# Patient Record
Sex: MALE | Race: OTHER | NOT HISPANIC OR LATINO | URBAN - METROPOLITAN AREA
[De-identification: names, ages, dates, MRNs, and addresses within clinical notes are randomized per-mention and may not be internally consistent; named-entity substitution may affect disease eponyms.]

---

## 2022-03-31 ENCOUNTER — OUTPATIENT (OUTPATIENT)
Dept: OUTPATIENT SERVICES | Facility: HOSPITAL | Age: 37
LOS: 1 days | Discharge: HOME | End: 2022-03-31

## 2022-04-01 DIAGNOSIS — F10.10 ALCOHOL ABUSE, UNCOMPLICATED: ICD-10-CM

## 2023-02-17 ENCOUNTER — EMERGENCY (EMERGENCY)
Facility: HOSPITAL | Age: 38
LOS: 1 days | Discharge: HOME | End: 2023-02-17
Attending: EMERGENCY MEDICINE | Admitting: STUDENT IN AN ORGANIZED HEALTH CARE EDUCATION/TRAINING PROGRAM
Payer: MEDICAID

## 2023-02-17 VITALS
DIASTOLIC BLOOD PRESSURE: 79 MMHG | TEMPERATURE: 98 F | SYSTOLIC BLOOD PRESSURE: 120 MMHG | OXYGEN SATURATION: 99 % | HEART RATE: 76 BPM | RESPIRATION RATE: 16 BRPM

## 2023-02-17 PROCEDURE — 73070 X-RAY EXAM OF ELBOW: CPT | Mod: 26,LT

## 2023-02-17 PROCEDURE — 73110 X-RAY EXAM OF WRIST: CPT | Mod: 26,LT

## 2023-02-17 PROCEDURE — 71045 X-RAY EXAM CHEST 1 VIEW: CPT | Mod: 26

## 2023-02-17 PROCEDURE — 73000 X-RAY EXAM OF COLLAR BONE: CPT | Mod: 26,LT

## 2023-02-17 PROCEDURE — 99285 EMERGENCY DEPT VISIT HI MDM: CPT

## 2023-02-17 PROCEDURE — 73060 X-RAY EXAM OF HUMERUS: CPT | Mod: 26,LT

## 2023-02-17 PROCEDURE — 73090 X-RAY EXAM OF FOREARM: CPT | Mod: 26,LT,76

## 2023-02-17 PROCEDURE — 71250 CT THORAX DX C-: CPT | Mod: 26,MA

## 2023-02-17 RX ORDER — ACETAMINOPHEN 500 MG
650 TABLET ORAL ONCE
Refills: 0 | Status: COMPLETED | OUTPATIENT
Start: 2023-02-17 | End: 2023-02-17

## 2023-02-17 RX ORDER — OXYCODONE HYDROCHLORIDE 5 MG/1
5 TABLET ORAL ONCE
Refills: 0 | Status: DISCONTINUED | OUTPATIENT
Start: 2023-02-17 | End: 2023-02-17

## 2023-02-17 RX ORDER — CEFAZOLIN SODIUM 1 G
1000 VIAL (EA) INJECTION ONCE
Refills: 0 | Status: COMPLETED | OUTPATIENT
Start: 2023-02-17 | End: 2023-02-17

## 2023-02-17 RX ORDER — TETANUS TOXOID, REDUCED DIPHTHERIA TOXOID AND ACELLULAR PERTUSSIS VACCINE, ADSORBED 5; 2.5; 8; 8; 2.5 [IU]/.5ML; [IU]/.5ML; UG/.5ML; UG/.5ML; UG/.5ML
0.5 SUSPENSION INTRAMUSCULAR ONCE
Refills: 0 | Status: COMPLETED | OUTPATIENT
Start: 2023-02-17 | End: 2023-02-17

## 2023-02-17 RX ORDER — MORPHINE SULFATE 50 MG/1
4 CAPSULE, EXTENDED RELEASE ORAL ONCE
Refills: 0 | Status: DISCONTINUED | OUTPATIENT
Start: 2023-02-17 | End: 2023-02-17

## 2023-02-17 RX ADMIN — Medication 650 MILLIGRAM(S): at 19:17

## 2023-02-17 RX ADMIN — OXYCODONE HYDROCHLORIDE 5 MILLIGRAM(S): 5 TABLET ORAL at 19:17

## 2023-02-17 RX ADMIN — Medication 100 MILLIGRAM(S): at 23:52

## 2023-02-17 RX ADMIN — MORPHINE SULFATE 4 MILLIGRAM(S): 50 CAPSULE, EXTENDED RELEASE ORAL at 20:34

## 2023-02-17 RX ADMIN — Medication 650 MILLIGRAM(S): at 19:47

## 2023-02-17 RX ADMIN — OXYCODONE HYDROCHLORIDE 5 MILLIGRAM(S): 5 TABLET ORAL at 19:47

## 2023-02-17 RX ADMIN — TETANUS TOXOID, REDUCED DIPHTHERIA TOXOID AND ACELLULAR PERTUSSIS VACCINE, ADSORBED 0.5 MILLILITER(S): 5; 2.5; 8; 8; 2.5 SUSPENSION INTRAMUSCULAR at 23:54

## 2023-02-17 RX ADMIN — MORPHINE SULFATE 4 MILLIGRAM(S): 50 CAPSULE, EXTENDED RELEASE ORAL at 19:59

## 2023-02-17 NOTE — ED PROVIDER NOTE - CLINICAL SUMMARY MEDICAL DECISION MAKING FREE TEXT BOX
CC:  HPI Brief/Pertinent PE:  37-year-old male with any significant past medical conditions chief complaint of left wrist pain.  Patient was working fell approximately 15 rings of a ladder onto the floor onto his left wrist.  Patient denies any LOC complaining of some left upper chest pain as well.     DDx:   colles fx vs rib fx     Risk Factors: none     Labs/Rads:  x rays   Consults:  ortho   Fam/Collateral:   na   Medical Decisions/Progress:  will obtain x rays to r/o fx and ct chest to ro rib fx given tenderness w/ ap compression CC:  HPI Brief/Pertinent PE:  37-year-old male with any significant past medical conditions chief complaint of left wrist pain.  Patient was working fell approximately 15 rings of a ladder onto the floor onto his left wrist.  Patient denies any LOC complaining of some left upper chest pain as well.   DDx: colles fx vs rib fx Risk Factors: none Labs/Rads:x rays Consults:ortho Fam/Collateral: na Medical Decisions/Progress:will obtain x rays to r/o fx and ct chest to ro rib fx given tenderness w/ ap compression    Missy Hernandez MD attending physician.  37-year-old man who fell off of a ladder onto his left wrist.  He has a significantly deformed left wrist he does have an intact pulse and intact sensation in his fingers and can move each of the fingers independently.  He also has pain on the left side of the chest his abdomen is completely soft and nontender his other extremities have no other areas of tenderness or injury.  Ice placed Ortho paged x-rays done patient with significantly comminuted and displaced fracture in the articular surface.

## 2023-02-17 NOTE — ED PROVIDER NOTE - PHYSICAL EXAMINATION
GENERAL: Awake, alert, NAD  ABDOMEN: AP compression Tenderness left upper side  BACK: No midline spinal tenderness, no CVA tenderness  NEURO: A&Ox3. Moving all extremities.  ext: To left wrist  Colles' deformity externally no skin opening neurovascular intact otherwise  SKIN: Warm and dry. No rash.  PSYCH: Normal affect.

## 2023-02-17 NOTE — ED PROVIDER NOTE - ATTENDING CONTRIBUTION TO CARE
Missy Hernandez MD attending physician.  37-year-old man who fell off of a ladder onto his left wrist.  He has a significantly deformed left wrist he does have an intact pulse and intact sensation in his fingers and can move each of the fingers independently.  He also has pain on the left side of the chest his abdomen is completely soft and nontender his other extremities have no other areas of tenderness or injury.  Ice placed Ortho paged x-rays done patient with significantly comminuted and displaced fracture in the articular surface.    I performed a history and physical exam of the patient and discussed their management with the resident and /or advanced care provider. I reviewed the resident and /or ACP's note and agree with the documented findings and plan of care. My medical decison making and observations are found above.

## 2023-02-17 NOTE — ED ADULT TRIAGE NOTE - CHIEF COMPLAINT QUOTE
Pt fell off 15 foot ladder injuring his Lt wrist. Pt has no ROM, endorses numbness/tingling from Lt elbow to Lt wrist, pt has deformity to Lt wrist, no break in skin. Pt endorses excruciating pain to Lt wrist.

## 2023-02-17 NOTE — ED ADULT NURSE NOTE - OBJECTIVE STATEMENT
Received pt to rm 13 with c/o L wrist pain s/p fall off ladder. Pt reports he was approx. 15ft up on a ladder when he lost his balance and fell landing on his L side. Pt denies head strike, anticoagulant use, LOC, head neck or back pain. Pt arrives with obvious deformity to L wrist. +PMS, limited ROM secondary to pain. Pt denies past medical hx. Pt endorses to shoulder/clavicle pain reports landing on the L side. Pt denies chest pain, difficulty breathing, fever, cough or chills. Pt medicated as ordered.

## 2023-02-17 NOTE — ED PROVIDER NOTE - OBJECTIVE STATEMENT
37-year-old male with any significant past medical conditions chief complaint of left wrist pain.  Patient was working fell approximately 15 rings of a ladder onto the floor onto his left wrist.  Patient denies any LOC complaining of some left upper chest pain as well.

## 2023-02-17 NOTE — ED ADULT NURSE NOTE - NSFALLRSKOUTCOME_ED_ALL_ED
[place]  ID: [id number]  Per , mobile phone continuously rings and there is no voicemail reached.   Patient reached on home phone.  He was not hospitalized, he went to visit a friend. They would not let him in, so then he asked if he could get a COVID vaccine at the hospital. They told him to call his doctor.  Informed him to look out for calls, text, and letter inviting him to register for the COVID vaccine as we receive more supply. Encouraged him to also register at local pharmacies and health departments, as they may have supply sooner than [facility].    Mobile phone number updated per patient, was incorrect on chart.    Fall Risk

## 2023-02-18 VITALS
DIASTOLIC BLOOD PRESSURE: 76 MMHG | OXYGEN SATURATION: 100 % | SYSTOLIC BLOOD PRESSURE: 129 MMHG | HEART RATE: 63 BPM | RESPIRATION RATE: 18 BRPM | TEMPERATURE: 99 F

## 2023-02-18 LAB
ALBUMIN SERPL ELPH-MCNC: 4.7 G/DL — SIGNIFICANT CHANGE UP (ref 3.3–5)
ALP SERPL-CCNC: 51 U/L — SIGNIFICANT CHANGE UP (ref 40–120)
ALT FLD-CCNC: 17 U/L — SIGNIFICANT CHANGE UP (ref 4–41)
ANION GAP SERPL CALC-SCNC: 13 MMOL/L — SIGNIFICANT CHANGE UP (ref 7–14)
APTT BLD: 31.3 SEC — SIGNIFICANT CHANGE UP (ref 27–36.3)
AST SERPL-CCNC: 19 U/L — SIGNIFICANT CHANGE UP (ref 4–40)
BASOPHILS # BLD AUTO: 0.01 K/UL — SIGNIFICANT CHANGE UP (ref 0–0.2)
BASOPHILS NFR BLD AUTO: 0.2 % — SIGNIFICANT CHANGE UP (ref 0–2)
BILIRUB SERPL-MCNC: 0.6 MG/DL — SIGNIFICANT CHANGE UP (ref 0.2–1.2)
BLD GP AB SCN SERPL QL: NEGATIVE — SIGNIFICANT CHANGE UP
BUN SERPL-MCNC: 14 MG/DL — SIGNIFICANT CHANGE UP (ref 7–23)
CALCIUM SERPL-MCNC: 9.4 MG/DL — SIGNIFICANT CHANGE UP (ref 8.4–10.5)
CHLORIDE SERPL-SCNC: 102 MMOL/L — SIGNIFICANT CHANGE UP (ref 98–107)
CO2 SERPL-SCNC: 21 MMOL/L — LOW (ref 22–31)
CREAT SERPL-MCNC: 0.83 MG/DL — SIGNIFICANT CHANGE UP (ref 0.5–1.3)
EGFR: 114 ML/MIN/1.73M2 — SIGNIFICANT CHANGE UP
EOSINOPHIL # BLD AUTO: 0.03 K/UL — SIGNIFICANT CHANGE UP (ref 0–0.5)
EOSINOPHIL NFR BLD AUTO: 0.5 % — SIGNIFICANT CHANGE UP (ref 0–6)
FLUAV AG NPH QL: SIGNIFICANT CHANGE UP
FLUBV AG NPH QL: SIGNIFICANT CHANGE UP
GLUCOSE SERPL-MCNC: 116 MG/DL — HIGH (ref 70–99)
HCT VFR BLD CALC: 40.1 % — SIGNIFICANT CHANGE UP (ref 39–50)
HGB BLD-MCNC: 13 G/DL — SIGNIFICANT CHANGE UP (ref 13–17)
IANC: 5.22 K/UL — SIGNIFICANT CHANGE UP (ref 1.8–7.4)
IMM GRANULOCYTES NFR BLD AUTO: 0.2 % — SIGNIFICANT CHANGE UP (ref 0–0.9)
INR BLD: 1.11 RATIO — SIGNIFICANT CHANGE UP (ref 0.88–1.16)
LYMPHOCYTES # BLD AUTO: 0.75 K/UL — LOW (ref 1–3.3)
LYMPHOCYTES # BLD AUTO: 11.3 % — LOW (ref 13–44)
MCHC RBC-ENTMCNC: 28.8 PG — SIGNIFICANT CHANGE UP (ref 27–34)
MCHC RBC-ENTMCNC: 32.4 GM/DL — SIGNIFICANT CHANGE UP (ref 32–36)
MCV RBC AUTO: 88.9 FL — SIGNIFICANT CHANGE UP (ref 80–100)
MONOCYTES # BLD AUTO: 0.62 K/UL — SIGNIFICANT CHANGE UP (ref 0–0.9)
MONOCYTES NFR BLD AUTO: 9.3 % — SIGNIFICANT CHANGE UP (ref 2–14)
NEUTROPHILS # BLD AUTO: 5.22 K/UL — SIGNIFICANT CHANGE UP (ref 1.8–7.4)
NEUTROPHILS NFR BLD AUTO: 78.5 % — HIGH (ref 43–77)
NRBC # BLD: 0 /100 WBCS — SIGNIFICANT CHANGE UP (ref 0–0)
NRBC # FLD: 0 K/UL — SIGNIFICANT CHANGE UP (ref 0–0)
PLATELET # BLD AUTO: 214 K/UL — SIGNIFICANT CHANGE UP (ref 150–400)
POTASSIUM SERPL-MCNC: 3.9 MMOL/L — SIGNIFICANT CHANGE UP (ref 3.5–5.3)
POTASSIUM SERPL-SCNC: 3.9 MMOL/L — SIGNIFICANT CHANGE UP (ref 3.5–5.3)
PROT SERPL-MCNC: 7.1 G/DL — SIGNIFICANT CHANGE UP (ref 6–8.3)
PROTHROM AB SERPL-ACNC: 12.9 SEC — SIGNIFICANT CHANGE UP (ref 10.5–13.4)
RBC # BLD: 4.51 M/UL — SIGNIFICANT CHANGE UP (ref 4.2–5.8)
RBC # FLD: 13.3 % — SIGNIFICANT CHANGE UP (ref 10.3–14.5)
RH IG SCN BLD-IMP: POSITIVE — SIGNIFICANT CHANGE UP
RH IG SCN BLD-IMP: POSITIVE — SIGNIFICANT CHANGE UP
RSV RNA NPH QL NAA+NON-PROBE: SIGNIFICANT CHANGE UP
SARS-COV-2 RNA SPEC QL NAA+PROBE: SIGNIFICANT CHANGE UP
SODIUM SERPL-SCNC: 136 MMOL/L — SIGNIFICANT CHANGE UP (ref 135–145)
WBC # BLD: 6.64 K/UL — SIGNIFICANT CHANGE UP (ref 3.8–10.5)
WBC # FLD AUTO: 6.64 K/UL — SIGNIFICANT CHANGE UP (ref 3.8–10.5)

## 2023-02-18 PROCEDURE — 99223 1ST HOSP IP/OBS HIGH 75: CPT

## 2023-02-18 PROCEDURE — 73200 CT UPPER EXTREMITY W/O DYE: CPT | Mod: 26,LT,MG

## 2023-02-18 PROCEDURE — G1004: CPT

## 2023-02-18 RX ORDER — DEXAMETHASONE 0.5 MG/5ML
10 ELIXIR ORAL ONCE
Refills: 0 | Status: COMPLETED | OUTPATIENT
Start: 2023-02-18 | End: 2023-02-18

## 2023-02-18 RX ORDER — DEXAMETHASONE 0.5 MG/5ML
10 ELIXIR ORAL ONCE
Refills: 0 | Status: DISCONTINUED | OUTPATIENT
Start: 2023-02-18 | End: 2023-02-18

## 2023-02-18 RX ORDER — CEPHALEXIN 500 MG
1 CAPSULE ORAL
Qty: 40 | Refills: 0
Start: 2023-02-18 | End: 2023-02-27

## 2023-02-18 RX ORDER — OXYCODONE AND ACETAMINOPHEN 5; 325 MG/1; MG/1
1 TABLET ORAL
Qty: 9 | Refills: 0
Start: 2023-02-18 | End: 2023-02-20

## 2023-02-18 RX ORDER — HYDROMORPHONE HYDROCHLORIDE 2 MG/ML
0.5 INJECTION INTRAMUSCULAR; INTRAVENOUS; SUBCUTANEOUS ONCE
Refills: 0 | Status: DISCONTINUED | OUTPATIENT
Start: 2023-02-18 | End: 2023-02-18

## 2023-02-18 RX ORDER — CEFAZOLIN SODIUM 1 G
1000 VIAL (EA) INJECTION EVERY 8 HOURS
Refills: 0 | Status: DISCONTINUED | OUTPATIENT
Start: 2023-02-18 | End: 2023-02-21

## 2023-02-18 RX ADMIN — Medication 102 MILLIGRAM(S): at 03:56

## 2023-02-18 RX ADMIN — HYDROMORPHONE HYDROCHLORIDE 0.5 MILLIGRAM(S): 2 INJECTION INTRAMUSCULAR; INTRAVENOUS; SUBCUTANEOUS at 02:25

## 2023-02-18 RX ADMIN — Medication 100 MILLIGRAM(S): at 07:16

## 2023-02-18 NOTE — ED CDU PROVIDER INITIAL DAY NOTE - OBJECTIVE STATEMENT
37-year-old male with any significant past medical conditions chief complaint of left wrist pain.  Patient was working fell approximately 15 rings of a ladder onto the floor onto his left wrist.  Patient denies any LOC complaining of some left upper chest pain as well.    CDU SAVANAH Hall Note----  ED Provider HPI as above, reviewed.  Pt is a 36 yo male, no stated PMH, presenting to the ED after sustaining fall off ladder with no hx/o head trauma or LOC; no N/V; no other c/o apart from left wrist and left upper chest pain, both aggravated with movement.    In the ED, VSS.  Pt noted to have small wound in area of injury to left wrist; pt was given tetanus and covered with antibiotics for concern of open fx.  Labs grossly unremarkable.  X-rays performed of chest (no acute pathology noted), left clavicle, left humerus, left elbow, left forearm, left wrist: ".....IMPRESSION:  Acute, comminuted intra-articular fracture of the distal left radial diaphysis with dorsal displacement of the distal fragment end apex volar   angulation with volar subluxation of the ulna.  No elbow joint effusion.  No elbow or humeral fracture or dislocation....".  CT chest was performed: "....Impression:  No acute intrathoracic pathology identified.".  Ortho was consulted and fracture reduced in ED with pt splinted; Ortho stated concern for significant numbness to left hand; advised observation and reassessment of neurovascular status; team to closely follow/reassess; pt was dispo'd to CDU accordingly.

## 2023-02-18 NOTE — ED CDU PROVIDER DISPOSITION NOTE - CLINICAL COURSE
Patient is a 37-year-old male with no pertinent past medical history presents with left wrist pain since yesterday.  Patient reports while standing on a ladder, patient accidentally fell off landing onto left wrist without associated head trauma or LOC.  Patient reports sudden onset left wrist pain, severe in intensity, aching in quality.  Patient also reports left upper chest and anterior shoulder pain described as mild soreness.  Patient denies any fevers, chills, headache, neck pain, shortness of breath, abdominal pain, back pain, hip pain, use of blood thinners, dizziness, lightheadedness, nausea, vomiting.  Patient reporting some numbness and tingling to the palmar aspect of the digits of his left hand.  In the emergency department, patient was found to have a small wound at left wrist.  In the emergency department patient had x-ray of left wrist with following impression: "Acute, comminuted intra-articular fracture of the distal left radial diaphysis with dorsal displacement of the distal fragment end apex volar   angulation with volar subluxation of the ulna.  No elbow joint effusion.  No elbow or humeral fracture or dislocation."  Patient has CT chest with following impression: "No acute intrathoracic pathology identified" patient was evaluated by orthopedics.  They recommended antibiotics and they placed a splint.  Patient was placed in the observation unit for orthopedics to reassess neurovascular status.  On 2/18/2023, patient reports significant improvement in numbness.  Orthopedics reevaluated patient on this day and they recommended CT left wrist without IV contrast.  Patient was deemed medically stable for discharge with instructions to follow-up with primary physician and orthopedic physician Dr. Avendaño.

## 2023-02-18 NOTE — ED CDU PROVIDER INITIAL DAY NOTE - CLINICAL SUMMARY MEDICAL DECISION MAKING FREE TEXT BOX
Continue IV antibiotics, supportive care, elevate LUE, neurovascular checks, Ortho team closely following pt; general observation care / monitoring.

## 2023-02-18 NOTE — ED CDU PROVIDER INITIAL DAY NOTE - PHYSICAL EXAMINATION
Pt s/p management in ED by Ortho team with WILLIE castro.    CONSTITUTIONAL:  Well appearing, awake, alert, oriented to person, place, time/situation and in no apparent distress.  Pt. is objectively comfortable appearing and verbalizing in full, clear, effortless sentences.  ENMT: NC/AT.  Airway patent.  Nasal mucosa clear.  Moist mucous membranes.  Neck supple; no midline TTP.  EYES:  Clear OU.  CARDIAC:  Normal rate, regular rhythm.  Heart sounds S1 S2.  No murmurs, gallops, or rubs.  RESPIRATORY:  Breath sounds clear and equal bilaterally.  No wheezes, no rales, no rhonchi.  GASTROINTESTINAL:  Abdomen soft, non-distended, non-tender.  No rebound, no guarding.  NEUROLOGICAL:  Alert and oriented to person/place/time/situation.  No gross deficits apart from LUE digits numbness as below.  MUSCULOSKELETAL:  Range of motion limited to LUE forearm/wrist.  Digits mobile; +subjective numbness of digits, more pronounced digits #3-5.  SKIN:  Cap refill brisk.  Skin warm, dry.  PSYCHIATRIC:  Alert and oriented to person/place/time/situation.  Mood and affect WNL.  No apparent risk to self or others.

## 2023-02-18 NOTE — ED CDU PROVIDER DISPOSITION NOTE - ATTENDING CONTRIBUTION TO CARE
Agree with above.    Discharge Note: Patient evaluated by CDU attending. Patient feels better, states his subjective decreased sensation has improved, otherwise denies pain. VSS Exam unremarkable. Labs reviewed. After CDU evaluation and observation, there is no further need for observation or acute in-patient hospital admission. Patient is medically stable for discharge. Patient has been given copies of all tests done in ED and CDU and advised to follow up with Dr. Avendaño (Ortho) in 48-72 hours. Patient advised to return to ED if symptoms worsens or develops signs of compartment syndrome which was discussed in detail with patient. Patient placed in arm sling. Discussed splint care at home with patient.

## 2023-02-18 NOTE — CONSULT NOTE ADULT - SUBJECTIVE AND OBJECTIVE BOX
Orthopedic Surgery Consult Note    37yMale RHD presents s/p mechanical fall from 10ft off ladder while installing crown molding onto left arm. Reports pain and difficulty moving affected extremity afterward. Denies headstrike/LOC. While in ED patient took a nap and when he awoke found he had difficulty moving and feeling his fingers. Also notes an abrasion over wrist.      PAST MEDICAL & SURGICAL HISTORY:  No pertinent past medical history      No significant past surgical history        Allergies    No Known Allergies    Intolerances          PE  Gen: NAD  LUE:  Visible deformity, moderate swelling/ecchymosis about wrist  Abrasion over volar wrist with 2 oozing poke holes on ulnar aspect  Decreased ROM of wrist 2/2 pain, +ttp about wrist, no ttp about elbow  denies sensation throughout hand  flickers motor M/U/R  Compartments soft/compressible  Radial pulse palpable, WWP distally    Secondary: L chest wall TTP. No TTP over bony prominences. SILT b/l, ROM intact b/l. Distal pulses palpable.     Imaging:  XR showing comminuted intra-articular left distal radius fracture    Procedure Note:  After informed verbal consent obtained, patient underwent hematoma block at the fracture site. Poke holes gently irrigated and covered with xeroform. Skin was cleaned with alcohol swab and fracture site was then injected with 10 cc of 1% lidocaine into hematoma. Hung in traction and closed reduction maneuver performed. Patient placed in well-padded, molded sugartong splint.  Patient tolerated the procedure well. Post reduction x-rays reviewed in improved alignment.    Post procedure patient with incremental improvement in neurovascular exam. Seen at 30/60/90 min post reduction with most recent exam @ 0130: SILT radial and median over index finger, decreased light touch w/ + pinprick median over middle finger and ulnar, weak but improved motor AIN/ulnar, PIN intact    A/P: 37yMale s/p closed reduction and splinting of left distal radius fracture  - s/p tetanus in ED  - Ancef q8h in house, dc on Keflex for 7d  - Decadron 10mg x1 IV to help with swelling/pain  - Pain control  - Strict Ice/Elevation  - NWB on affected extremity in splint  - Sling for comfort  - Keep splint clean/dry/intact until follow up  - Encourage active finger motion  - Will re-eval neurovascular exam in AM to determine timing of operative intervention  - NPO for now pending final plan/re-exam    Discussed and examined with attending orthopaedic hand surgeon on call, Dr. Avendaño.

## 2023-02-18 NOTE — ED CDU PROVIDER DISPOSITION NOTE - PATIENT PORTAL LINK FT
You can access the FollowMyHealth Patient Portal offered by [facility] by registering at the following website: [email or web address]. By joining [employer]’s FollowMyHealth portal, you will also be able to view your health information using other applications (apps) compatible with our system.

## 2023-02-18 NOTE — ED CDU PROVIDER INITIAL DAY NOTE - NS ED ATTENDING STATEMENT MOD
This was a shared visit with the JOSE R. I reviewed and verified the documentation and independently performed the documented:

## 2023-02-18 NOTE — ED CDU PROVIDER DISPOSITION NOTE - NSFOLLOWUPINSTRUCTIONS_ED_ALL_ED_FT
Advance activity as tolerated.  Continue all previously prescribed medications as directed unless otherwise instructed.   Keep cast or splint clean and dry. Keep extremity elevated.  Take Motrin (also sold as Advil or Ibuprofen) 400-600 mg (two or three 200 mg over the counter pills) every 8 hours as needed for moderate pain or fevers-- take with food; do not take for more than 5 consecutive days.  Take Keflex 500 mg four times a day for 7 days -- finish this antibiotic.  Take Percocet 325/5 once every 6 hours as needed for severe pain -- causes drowsiness; DO NOT drink alcohol, drive, or operate heavy machinery with this medication.  Caution federal law prohibits the transfer of this drug to any person other  than the person for whom it was prescribed. May cause drowsiness.  Alcohol may intensify this effect.  Use care when operating dangerous machinery.  This prescription cannot be refilled. Using more of this medication than prescribed may cause serious breathing problems   Follow up with your primary care physician and orthopedics (referral list provided or call [phone] to make an appointment with the orthopedics clinic)  in 48-72 hours- bring copies of your results.  Return to the ER for worsening or persistent symptoms, including but not limited to worsening/persistent pain, fever, redness, swelling, numbness, weakness, difficulty standing/walking, falls, and/or ANY NEW OR CONCERNING SYMPTOMS. If you have issues obtaining follow up, please call: [phone] to obtain a doctor or specialist who takes your insurance in your area.  You may call [phone] to make an appointment with the internal medicine clinic.

## 2023-02-18 NOTE — ED CDU PROVIDER INITIAL DAY NOTE - INDICATION FOR OBSERVATION
Pending Prescriptions:                       Disp   Refills    chlorthalidone (HYGROTON) 25 MG tablet [Ph*64 tab*0        Sig: TAKE 1 TABLET BY MOUTH EVERY DAY      Routing refill request to provider for review/approval because:  Dasha given x1 and patient did not follow up, please advise  Blood pressure under 140/90 in past 12 months    Recent (12 mo) or future (30 days) visit within the authorizing provider's specialty    Normal serum creatinine on file in past 12 months    Normal serum potassium on file in past 12 months    Normal serum sodium on file in past 12 months     Next 5 appointments (look out 90 days)      Jan 05, 2023  1:00 PM  (Arrive by 12:40 PM)  Adult Preventative Visit with Alexandrea Mcqueen NP  [facility] ([facility] ) [address] [place] [id number] [phone]            Keturah Fink RN           Other

## 2023-02-18 NOTE — ED CDU PROVIDER DISPOSITION NOTE - CARE PROVIDER_API CALL
Jamari Avendaño)  Orthopedics  [address] [place] [address]  Phone: [phone]  Fax: [phone]  Follow Up Time:

## 2023-02-18 NOTE — CHART NOTE - NSCHARTNOTEFT_GEN_A_CORE
Patient seen and re-examined. Pain much improved. SILT M/U/R with decreased sensation over middle, ring, and small finger but improving, AIN/PIN/Ulnar motor intact with strength improving    Plan:   Outpatient ORIF of L distal radius fx  CT L wrist for preop planning before dc  dc on 7d keflex  NWB LUE in splint with sling for comfort    Splint precautions:  Keep splint dry  Elevate extremity, can try and ice through the splint  Do not stick anything into the splint  Monitor for signs of pressure build up from swelling: pain not controlled with Tylenol/motrin, severe pain when moves the fingers/toes, numbness/tingling. If signs develop, call the office or return to ED immediately.    Follow up in the office this week with Dr. Avendaño, call [phone] for appt.

## 2023-02-18 NOTE — ED CDU PROVIDER INITIAL DAY NOTE - ATTENDING APP SHARED VISIT CONTRIBUTION OF CARE
I have personally performed a face to face medical and diagnostic evaluation of the patient. I have discussed with and reviewed the Resident's and/or ACP's and/or Medical/PA/NP student's note and agree with the History, ROS, Physical Exam and MDM unless otherwise indicated. A brief summary of my personal evaluation and impression can be found below.    37y M w/ no PMHx presenting to the ED after sustaining fall off ladder with no hx/o head trauma or LOC; no N/V; no other c/o apart from left wrist and left upper chest pain, both aggravated with movement.      In the ED, VSS.  Pt noted to have small wound in area of injury to left wrist; pt was given tetanus and covered with antibiotics for concern of open fx.  Labs grossly unremarkable.  X-rays with findings of  left distal radius fracture, was reduced and splinted in ED by Ortho team, placed in CDU for neurovascular checks for complain of decreased sensation to L 3rd-5th digits.     On exam: patient states decreased sensation has improved, still mild subjective decreased sensation of L 3rd-5th compared to R, intact capillary refill. Will appreciate final Ortho recs. No clinical concern at this time for compartment syndrome. Patient resting comfortably on stretcher.

## 2023-02-18 NOTE — ED ADULT NURSE REASSESSMENT NOTE - NS ED NURSE REASSESS COMMENT FT1
Break RN: pt a&ox4, c/o cp, MD Hernandez aware. Pt denies headache, dizziness, n/v, sob. Breathing even, unlabored. Pt medicated as per MAR. pt pending imaging results.
Pt in room 13. a&ox4, amb at baseline. Notable swelling to L wrist. Pulses and sensation intact. Small dime-sized amount of bleeding noted to L wrist area. Pain in area post medicating is an 8/10 MD made aware. Pt awaiting CT results
Pt reports no change in pain, MD made aware, advised will order stronger medications. #20g IV placed to R forearm.
Pt in room 13. A&Ox4, amb at baseline. Vitally stable. Pt L wrist in cast placed by ortho at bedside. Pre op labs sent as pt is now having the 3rd digit finger with no sensation noted and decreased sensation in the other fingers of the hand. Ortho at bedside and aware. Currently waiting on Ortho recs. Pt endorsing 8/10 pain to L wrist post medicating. MD made aware

## 2023-02-26 PROBLEM — Z00.00 ENCOUNTER FOR PREVENTIVE HEALTH EXAMINATION: Status: ACTIVE | Noted: 2023-02-26

## 2023-02-28 ENCOUNTER — NON-APPOINTMENT (OUTPATIENT)
Age: 38
End: 2023-02-28

## 2023-02-28 ENCOUNTER — APPOINTMENT (OUTPATIENT)
Dept: ORTHOPEDIC SURGERY | Facility: CLINIC | Age: 38
End: 2023-02-28
Payer: MEDICAID

## 2023-02-28 VITALS — HEIGHT: 73 IN | BODY MASS INDEX: 19.22 KG/M2 | WEIGHT: 145 LBS

## 2023-02-28 PROCEDURE — 99204 OFFICE O/P NEW MOD 45 MIN: CPT

## 2023-03-07 ENCOUNTER — OUTPATIENT (OUTPATIENT)
Dept: OUTPATIENT SERVICES | Facility: HOSPITAL | Age: 38
LOS: 1 days | End: 2023-03-07
Payer: MEDICAID

## 2023-03-07 VITALS
OXYGEN SATURATION: 99 % | RESPIRATION RATE: 18 BRPM | HEART RATE: 65 BPM | TEMPERATURE: 98 F | SYSTOLIC BLOOD PRESSURE: 133 MMHG | WEIGHT: 138.89 LBS | HEIGHT: 73 IN | DIASTOLIC BLOOD PRESSURE: 79 MMHG

## 2023-03-07 DIAGNOSIS — Z01.818 ENCOUNTER FOR OTHER PREPROCEDURAL EXAMINATION: ICD-10-CM

## 2023-03-07 DIAGNOSIS — Z11.52 ENCOUNTER FOR SCREENING FOR COVID-19: ICD-10-CM

## 2023-03-07 DIAGNOSIS — F17.200 NICOTINE DEPENDENCE, UNSPECIFIED, UNCOMPLICATED: ICD-10-CM

## 2023-03-07 DIAGNOSIS — S52.572D OTHER INTRAARTICULAR FRACTURE OF LOWER END OF LEFT RADIUS, SUBSEQUENT ENCOUNTER FOR CLOSED FRACTURE WITH ROUTINE HEALING: ICD-10-CM

## 2023-03-07 LAB
ANION GAP SERPL CALC-SCNC: 10 MMOL/L — SIGNIFICANT CHANGE UP (ref 5–17)
BUN SERPL-MCNC: 16 MG/DL — SIGNIFICANT CHANGE UP (ref 7–23)
CALCIUM SERPL-MCNC: 10 MG/DL — SIGNIFICANT CHANGE UP (ref 8.4–10.5)
CHLORIDE SERPL-SCNC: 103 MMOL/L — SIGNIFICANT CHANGE UP (ref 96–108)
CO2 SERPL-SCNC: 26 MMOL/L — SIGNIFICANT CHANGE UP (ref 22–31)
CREAT SERPL-MCNC: 0.86 MG/DL — SIGNIFICANT CHANGE UP (ref 0.5–1.3)
EGFR: 113 ML/MIN/1.73M2 — SIGNIFICANT CHANGE UP
GLUCOSE SERPL-MCNC: 79 MG/DL — SIGNIFICANT CHANGE UP (ref 70–99)
HCT VFR BLD CALC: 42.3 % — SIGNIFICANT CHANGE UP (ref 39–50)
HGB BLD-MCNC: 13.7 G/DL — SIGNIFICANT CHANGE UP (ref 13–17)
MCHC RBC-ENTMCNC: 29.4 PG — SIGNIFICANT CHANGE UP (ref 27–34)
MCHC RBC-ENTMCNC: 32.4 GM/DL — SIGNIFICANT CHANGE UP (ref 32–36)
MCV RBC AUTO: 90.8 FL — SIGNIFICANT CHANGE UP (ref 80–100)
NRBC # BLD: 0 /100 WBCS — SIGNIFICANT CHANGE UP (ref 0–0)
PLATELET # BLD AUTO: 309 K/UL — SIGNIFICANT CHANGE UP (ref 150–400)
POTASSIUM SERPL-MCNC: 4.1 MMOL/L — SIGNIFICANT CHANGE UP (ref 3.5–5.3)
POTASSIUM SERPL-SCNC: 4.1 MMOL/L — SIGNIFICANT CHANGE UP (ref 3.5–5.3)
RBC # BLD: 4.66 M/UL — SIGNIFICANT CHANGE UP (ref 4.2–5.8)
RBC # FLD: 13.2 % — SIGNIFICANT CHANGE UP (ref 10.3–14.5)
SARS-COV-2 RNA SPEC QL NAA+PROBE: SIGNIFICANT CHANGE UP
SODIUM SERPL-SCNC: 139 MMOL/L — SIGNIFICANT CHANGE UP (ref 135–145)
WBC # BLD: 3.18 K/UL — LOW (ref 3.8–10.5)
WBC # FLD AUTO: 3.18 K/UL — LOW (ref 3.8–10.5)

## 2023-03-07 PROCEDURE — 36415 COLL VENOUS BLD VENIPUNCTURE: CPT

## 2023-03-07 PROCEDURE — U0003: CPT

## 2023-03-07 PROCEDURE — G0463: CPT

## 2023-03-07 PROCEDURE — U0005: CPT

## 2023-03-07 PROCEDURE — 80048 BASIC METABOLIC PNL TOTAL CA: CPT

## 2023-03-07 PROCEDURE — C9803: CPT

## 2023-03-07 PROCEDURE — 85027 COMPLETE CBC AUTOMATED: CPT

## 2023-03-07 RX ORDER — SODIUM CHLORIDE 9 MG/ML
1000 INJECTION, SOLUTION INTRAVENOUS
Refills: 0 | Status: DISCONTINUED | OUTPATIENT
Start: 2023-03-10 | End: 2023-03-24

## 2023-03-07 RX ORDER — SODIUM CHLORIDE 9 MG/ML
3 INJECTION INTRAMUSCULAR; INTRAVENOUS; SUBCUTANEOUS EVERY 8 HOURS
Refills: 0 | Status: DISCONTINUED | OUTPATIENT
Start: 2023-03-10 | End: 2023-03-24

## 2023-03-07 NOTE — H&P PST ADULT - HISTORY OF PRESENT ILLNESS
36 yo M with PMHx significant for     Denies Recent travel, Exposure or Covid symptoms   36 yo M current smoker (e-cigarette) with no significant PMHx who reports an injury to left wrist sp fall from ladder in February 2023. He reports 8/10 pain in left wrist mostly at night prior to his next dose of pain medication. He is scheduled for Left distal radius ORIF.     Denies Recent travel, Exposure or Covid symptoms   36 yo M current smoker (e-cigarettes) with no significant PMHx who reports an injury to left wrist sp fall from ladder in February 2023. He reports 8/10 pain in left wrist which occurs at night prior to his next dose of pain medication. He is scheduled for Left distal radius ORIF.     Denies Recent travel, Exposure or Covid symptoms

## 2023-03-07 NOTE — H&P PST ADULT - PROBLEM SELECTOR PROBLEM 1
Other intraarticular fracture of lower end of left radius, subsequent encounter for closed fracture with routine healing

## 2023-03-07 NOTE — H&P PST ADULT - ASSESSMENT
DASI score:  DASI activity:  Loose teeth or denture:   DASI score: 6.7 mets  DASI activity: able to walk 30 minutes, climb 2-3 flights of stairs, grocery shopping  Loose teeth or denture: no   DASI score: 6.7 mets  DASI activity: able to walk 30 minutes, climb 2-3 flights of stairs, grocery shopping  Loose teeth or denture: no    mallampati 1

## 2023-03-09 ENCOUNTER — TRANSCRIPTION ENCOUNTER (OUTPATIENT)
Age: 38
End: 2023-03-09

## 2023-03-09 RX ORDER — OXYCODONE HYDROCHLORIDE 5 MG/1
5 TABLET ORAL ONCE
Refills: 0 | Status: DISCONTINUED | OUTPATIENT
Start: 2023-03-10 | End: 2023-03-10

## 2023-03-09 RX ORDER — FENTANYL CITRATE 50 UG/ML
25 INJECTION INTRAVENOUS
Refills: 0 | Status: DISCONTINUED | OUTPATIENT
Start: 2023-03-10 | End: 2023-03-10

## 2023-03-09 RX ORDER — OXYCODONE 5 MG/1
5 TABLET ORAL
Qty: 20 | Refills: 0 | Status: ACTIVE | COMMUNITY
Start: 2023-03-09 | End: 1900-01-01

## 2023-03-09 RX ORDER — ONDANSETRON 8 MG/1
4 TABLET, FILM COATED ORAL ONCE
Refills: 0 | Status: DISCONTINUED | OUTPATIENT
Start: 2023-03-10 | End: 2023-03-24

## 2023-03-10 ENCOUNTER — TRANSCRIPTION ENCOUNTER (OUTPATIENT)
Age: 38
End: 2023-03-10

## 2023-03-10 ENCOUNTER — APPOINTMENT (OUTPATIENT)
Dept: ORTHOPEDIC SURGERY | Facility: HOSPITAL | Age: 38
End: 2023-03-10

## 2023-03-10 ENCOUNTER — OUTPATIENT (OUTPATIENT)
Dept: OUTPATIENT SERVICES | Facility: HOSPITAL | Age: 38
LOS: 1 days | End: 2023-03-10
Payer: MEDICAID

## 2023-03-10 VITALS
DIASTOLIC BLOOD PRESSURE: 73 MMHG | SYSTOLIC BLOOD PRESSURE: 123 MMHG | RESPIRATION RATE: 14 BRPM | TEMPERATURE: 98 F | OXYGEN SATURATION: 100 % | HEART RATE: 65 BPM

## 2023-03-10 VITALS
HEART RATE: 53 BPM | HEIGHT: 73 IN | WEIGHT: 138.89 LBS | DIASTOLIC BLOOD PRESSURE: 73 MMHG | OXYGEN SATURATION: 100 % | RESPIRATION RATE: 18 BRPM | TEMPERATURE: 97 F | SYSTOLIC BLOOD PRESSURE: 116 MMHG

## 2023-03-10 DIAGNOSIS — S52.572D OTHER INTRAARTICULAR FRACTURE OF LOWER END OF LEFT RADIUS, SUBSEQUENT ENCOUNTER FOR CLOSED FRACTURE WITH ROUTINE HEALING: ICD-10-CM

## 2023-03-10 PROCEDURE — C1713: CPT

## 2023-03-10 PROCEDURE — 25608 OPTX DST RD XART FX/EPI SEP2: CPT | Mod: LT

## 2023-03-10 PROCEDURE — 25607 OPTX DST RD XARTC FX/EPI SEP: CPT | Mod: LT

## 2023-03-10 PROCEDURE — 76000 FLUOROSCOPY <1 HR PHYS/QHP: CPT

## 2023-03-10 DEVICE — SCREW TRILOCK 2.5X22MM: Type: IMPLANTABLE DEVICE | Site: LEFT | Status: FUNCTIONAL

## 2023-03-10 DEVICE — PLATE TRILOCK DIST RAD VOLAR LT: Type: IMPLANTABLE DEVICE | Site: LEFT | Status: FUNCTIONAL

## 2023-03-10 DEVICE — SCREW CORT 2.5X15MM: Type: IMPLANTABLE DEVICE | Site: LEFT | Status: FUNCTIONAL

## 2023-03-10 DEVICE — SCREW CORT 2.5X24MM: Type: IMPLANTABLE DEVICE | Site: LEFT | Status: FUNCTIONAL

## 2023-03-10 DEVICE — SCREW TRILOCK 2.5X16MM: Type: IMPLANTABLE DEVICE | Site: LEFT | Status: FUNCTIONAL

## 2023-03-10 DEVICE — K-WIRE MEDARTIS (SMOOTH) SINGLE TROCAR 1.6MM X 150MM: Type: IMPLANTABLE DEVICE | Site: LEFT | Status: FUNCTIONAL

## 2023-03-10 DEVICE — SCREW CORT 2.5X18MM: Type: IMPLANTABLE DEVICE | Site: LEFT | Status: FUNCTIONAL

## 2023-03-10 DEVICE — SCREW CORT 2.5X14MM: Type: IMPLANTABLE DEVICE | Site: LEFT | Status: FUNCTIONAL

## 2023-03-10 RX ORDER — LIDOCAINE HCL 20 MG/ML
0.2 VIAL (ML) INJECTION ONCE
Refills: 0 | Status: COMPLETED | OUTPATIENT
Start: 2023-03-10 | End: 2023-03-10

## 2023-03-10 RX ORDER — OXYCODONE HYDROCHLORIDE 5 MG/1
1 TABLET ORAL
Qty: 0 | Refills: 0 | DISCHARGE
Start: 2023-03-10

## 2023-03-10 RX ORDER — CEFAZOLIN SODIUM 1 G
2000 VIAL (EA) INJECTION ONCE
Refills: 0 | Status: COMPLETED | OUTPATIENT
Start: 2023-03-10 | End: 2023-03-10

## 2023-03-10 RX ADMIN — SODIUM CHLORIDE 100 MILLILITER(S): 9 INJECTION, SOLUTION INTRAVENOUS at 05:46

## 2023-03-10 RX ADMIN — SODIUM CHLORIDE 3 MILLILITER(S): 9 INJECTION INTRAMUSCULAR; INTRAVENOUS; SUBCUTANEOUS at 05:30

## 2023-03-10 NOTE — ASU DISCHARGE PLAN (ADULT/PEDIATRIC) - NS MD DC FALL RISK RISK
For information on Fall & Injury Prevention, visit: [email or web address] OR  [email or web address] OR  https://www.cdc.gov/steadi/patient.html

## 2023-03-10 NOTE — ASU DISCHARGE PLAN (ADULT/PEDIATRIC) - CALL YOUR DOCTOR IF YOU HAVE ANY OF THE FOLLOWING:
Bleeding that does not stop/Swelling that gets worse/Pain not relieved by Medications Bleeding that does not stop/Swelling that gets worse/Pain not relieved by Medications/Fever greater than (need to indicate Fahrenheit or Celsius)/Wound/Surgical Site with redness, or foul smelling discharge or pus/Numbness, tingling, color or temperature change to extremity/Nausea and vomiting that does not stop/Unable to urinate/Inability to tolerate liquids or foods

## 2023-03-10 NOTE — BRIEF OPERATIVE NOTE - NSICDXBRIEFPROCEDURE_GEN_ALL_CORE_FT
PROCEDURES:  Open reduction, fracture, radius, distal, intra-articular, 3 or more fragments 10-Mar-2023 09:19:46  Vivek Lugo

## 2023-03-21 ENCOUNTER — APPOINTMENT (OUTPATIENT)
Dept: ORTHOPEDIC SURGERY | Facility: CLINIC | Age: 38
End: 2023-03-21
Payer: MEDICAID

## 2023-03-21 PROCEDURE — 73100 X-RAY EXAM OF WRIST: CPT | Mod: LT

## 2023-03-21 PROCEDURE — 99024 POSTOP FOLLOW-UP VISIT: CPT

## 2023-03-30 NOTE — ASSESSMENT
[FreeTextEntry1] : 37 year-old M with a comminuted, L distal radius fracture with joint incongruity and unacceptable nonoperative alignment with an improving ulnar nerve palsy\par \par Plan:\par OR for ORIF L distal radius fracture\par Medical clearance and PSTs

## 2023-03-30 NOTE — PHYSICAL EXAM
[de-identified] : Gen: NAD\par RSP: Nonlabored\par MSK: LUE was seen and examined\par Sugartong splint in place\par Minimally decreased senstion at ulnar digits otherwise SILT\par Wiggles fingers\par Makes full composite fist except for what it is limited by splint\par <2s capillary refill [de-identified] : XR: 3 views from [facility] demonstrate a displaced, intra-articular distal radius fracture

## 2023-03-30 NOTE — HISTORY OF PRESENT ILLNESS
[FreeTextEntry1] : 37 year-old M presents to my office 2-3 weeks after left distal radius fracture.  Initially with ulnar nerve palsy that improved with reduction.  He presented to [facility] where he was closed reduced and splinted in a sugartong splint.  He denies median nerve symptoms.  Reports ulnar nerve numbness tingling is continuing to improve.  Here today to establish care and decide next steps.

## 2023-04-03 NOTE — HISTORY OF PRESENT ILLNESS
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

## 2023-04-04 ENCOUNTER — APPOINTMENT (OUTPATIENT)
Dept: ORTHOPEDIC SURGERY | Facility: CLINIC | Age: 38
End: 2023-04-04
Payer: MEDICAID

## 2023-04-04 PROCEDURE — 99024 POSTOP FOLLOW-UP VISIT: CPT

## 2023-04-04 PROCEDURE — 73100 X-RAY EXAM OF WRIST: CPT | Mod: LT

## 2023-04-30 NOTE — HISTORY OF PRESENT ILLNESS
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

## 2023-05-16 ENCOUNTER — APPOINTMENT (OUTPATIENT)
Dept: ORTHOPEDIC SURGERY | Facility: CLINIC | Age: 38
End: 2023-05-16
Payer: MEDICAID

## 2023-05-16 PROCEDURE — 99024 POSTOP FOLLOW-UP VISIT: CPT

## 2023-05-16 PROCEDURE — 73100 X-RAY EXAM OF WRIST: CPT | Mod: LT

## 2023-06-27 ENCOUNTER — APPOINTMENT (OUTPATIENT)
Dept: ORTHOPEDIC SURGERY | Facility: CLINIC | Age: 38
End: 2023-06-27
Payer: MEDICAID

## 2023-06-27 VITALS — HEIGHT: 73 IN | BODY MASS INDEX: 18.55 KG/M2 | WEIGHT: 140 LBS

## 2023-06-27 PROCEDURE — 73110 X-RAY EXAM OF WRIST: CPT | Mod: LT

## 2023-06-27 PROCEDURE — 99213 OFFICE O/P EST LOW 20 MIN: CPT | Mod: 25

## 2023-08-08 ENCOUNTER — APPOINTMENT (OUTPATIENT)
Dept: ORTHOPEDIC SURGERY | Facility: CLINIC | Age: 38
End: 2023-08-08
Payer: MEDICAID

## 2023-08-08 VITALS — BODY MASS INDEX: 18.55 KG/M2 | WEIGHT: 140 LBS | HEIGHT: 73 IN

## 2023-08-08 PROCEDURE — 99213 OFFICE O/P EST LOW 20 MIN: CPT | Mod: 25

## 2023-09-19 ENCOUNTER — APPOINTMENT (OUTPATIENT)
Dept: ORTHOPEDIC SURGERY | Facility: CLINIC | Age: 38
End: 2023-09-19

## 2023-12-12 ENCOUNTER — APPOINTMENT (OUTPATIENT)
Dept: ORTHOPEDIC SURGERY | Facility: CLINIC | Age: 38
End: 2023-12-12
Payer: MEDICAID

## 2023-12-12 PROCEDURE — 73110 X-RAY EXAM OF WRIST: CPT | Mod: LT

## 2023-12-12 PROCEDURE — 99214 OFFICE O/P EST MOD 30 MIN: CPT | Mod: 25

## 2023-12-21 ENCOUNTER — OUTPATIENT (OUTPATIENT)
Dept: OUTPATIENT SERVICES | Facility: HOSPITAL | Age: 38
LOS: 1 days | End: 2023-12-21
Payer: COMMERCIAL

## 2023-12-21 VITALS
HEART RATE: 69 BPM | DIASTOLIC BLOOD PRESSURE: 89 MMHG | WEIGHT: 138.89 LBS | SYSTOLIC BLOOD PRESSURE: 132 MMHG | TEMPERATURE: 99 F | OXYGEN SATURATION: 99 % | RESPIRATION RATE: 16 BRPM | HEIGHT: 73 IN

## 2023-12-21 DIAGNOSIS — Z01.818 ENCOUNTER FOR OTHER PREPROCEDURAL EXAMINATION: ICD-10-CM

## 2023-12-21 DIAGNOSIS — Z98.890 OTHER SPECIFIED POSTPROCEDURAL STATES: Chronic | ICD-10-CM

## 2023-12-21 DIAGNOSIS — M25.832 OTHER SPECIFIED JOINT DISORDERS, LEFT WRIST: ICD-10-CM

## 2023-12-21 LAB
ANION GAP SERPL CALC-SCNC: 12 MMOL/L — SIGNIFICANT CHANGE UP (ref 5–17)
ANION GAP SERPL CALC-SCNC: 12 MMOL/L — SIGNIFICANT CHANGE UP (ref 5–17)
BUN SERPL-MCNC: 14 MG/DL — SIGNIFICANT CHANGE UP (ref 7–23)
BUN SERPL-MCNC: 14 MG/DL — SIGNIFICANT CHANGE UP (ref 7–23)
CALCIUM SERPL-MCNC: 9.5 MG/DL — SIGNIFICANT CHANGE UP (ref 8.4–10.5)
CALCIUM SERPL-MCNC: 9.5 MG/DL — SIGNIFICANT CHANGE UP (ref 8.4–10.5)
CHLORIDE SERPL-SCNC: 100 MMOL/L — SIGNIFICANT CHANGE UP (ref 96–108)
CHLORIDE SERPL-SCNC: 100 MMOL/L — SIGNIFICANT CHANGE UP (ref 96–108)
CO2 SERPL-SCNC: 26 MMOL/L — SIGNIFICANT CHANGE UP (ref 22–31)
CO2 SERPL-SCNC: 26 MMOL/L — SIGNIFICANT CHANGE UP (ref 22–31)
CREAT SERPL-MCNC: 0.94 MG/DL — SIGNIFICANT CHANGE UP (ref 0.5–1.3)
CREAT SERPL-MCNC: 0.94 MG/DL — SIGNIFICANT CHANGE UP (ref 0.5–1.3)
EGFR: 105 ML/MIN/1.73M2 — SIGNIFICANT CHANGE UP
EGFR: 105 ML/MIN/1.73M2 — SIGNIFICANT CHANGE UP
GLUCOSE SERPL-MCNC: 93 MG/DL — SIGNIFICANT CHANGE UP (ref 70–99)
GLUCOSE SERPL-MCNC: 93 MG/DL — SIGNIFICANT CHANGE UP (ref 70–99)
HCT VFR BLD CALC: 44.6 % — SIGNIFICANT CHANGE UP (ref 39–50)
HCT VFR BLD CALC: 44.6 % — SIGNIFICANT CHANGE UP (ref 39–50)
HGB BLD-MCNC: 14.3 G/DL — SIGNIFICANT CHANGE UP (ref 13–17)
HGB BLD-MCNC: 14.3 G/DL — SIGNIFICANT CHANGE UP (ref 13–17)
MCHC RBC-ENTMCNC: 29.3 PG — SIGNIFICANT CHANGE UP (ref 27–34)
MCHC RBC-ENTMCNC: 29.3 PG — SIGNIFICANT CHANGE UP (ref 27–34)
MCHC RBC-ENTMCNC: 32.1 GM/DL — SIGNIFICANT CHANGE UP (ref 32–36)
MCHC RBC-ENTMCNC: 32.1 GM/DL — SIGNIFICANT CHANGE UP (ref 32–36)
MCV RBC AUTO: 91.4 FL — SIGNIFICANT CHANGE UP (ref 80–100)
MCV RBC AUTO: 91.4 FL — SIGNIFICANT CHANGE UP (ref 80–100)
NRBC # BLD: 0 /100 WBCS — SIGNIFICANT CHANGE UP (ref 0–0)
NRBC # BLD: 0 /100 WBCS — SIGNIFICANT CHANGE UP (ref 0–0)
PLATELET # BLD AUTO: 220 K/UL — SIGNIFICANT CHANGE UP (ref 150–400)
PLATELET # BLD AUTO: 220 K/UL — SIGNIFICANT CHANGE UP (ref 150–400)
POTASSIUM SERPL-MCNC: 4.4 MMOL/L — SIGNIFICANT CHANGE UP (ref 3.5–5.3)
POTASSIUM SERPL-MCNC: 4.4 MMOL/L — SIGNIFICANT CHANGE UP (ref 3.5–5.3)
POTASSIUM SERPL-SCNC: 4.4 MMOL/L — SIGNIFICANT CHANGE UP (ref 3.5–5.3)
POTASSIUM SERPL-SCNC: 4.4 MMOL/L — SIGNIFICANT CHANGE UP (ref 3.5–5.3)
RBC # BLD: 4.88 M/UL — SIGNIFICANT CHANGE UP (ref 4.2–5.8)
RBC # BLD: 4.88 M/UL — SIGNIFICANT CHANGE UP (ref 4.2–5.8)
RBC # FLD: 13.2 % — SIGNIFICANT CHANGE UP (ref 10.3–14.5)
RBC # FLD: 13.2 % — SIGNIFICANT CHANGE UP (ref 10.3–14.5)
SODIUM SERPL-SCNC: 138 MMOL/L — SIGNIFICANT CHANGE UP (ref 135–145)
SODIUM SERPL-SCNC: 138 MMOL/L — SIGNIFICANT CHANGE UP (ref 135–145)
WBC # BLD: 3.77 K/UL — LOW (ref 3.8–10.5)
WBC # BLD: 3.77 K/UL — LOW (ref 3.8–10.5)
WBC # FLD AUTO: 3.77 K/UL — LOW (ref 3.8–10.5)
WBC # FLD AUTO: 3.77 K/UL — LOW (ref 3.8–10.5)

## 2023-12-21 PROCEDURE — G0463: CPT

## 2023-12-21 PROCEDURE — 80048 BASIC METABOLIC PNL TOTAL CA: CPT

## 2023-12-21 PROCEDURE — 85027 COMPLETE CBC AUTOMATED: CPT

## 2023-12-21 RX ORDER — SODIUM CHLORIDE 9 MG/ML
3 INJECTION INTRAMUSCULAR; INTRAVENOUS; SUBCUTANEOUS EVERY 8 HOURS
Refills: 0 | Status: DISCONTINUED | OUTPATIENT
Start: 2024-01-04 | End: 2024-01-18

## 2023-12-21 RX ORDER — LIDOCAINE HCL 20 MG/ML
0.2 VIAL (ML) INJECTION ONCE
Refills: 0 | Status: DISCONTINUED | OUTPATIENT
Start: 2024-01-04 | End: 2024-01-18

## 2023-12-21 RX ORDER — CHLORHEXIDINE GLUCONATE 213 G/1000ML
1 SOLUTION TOPICAL ONCE
Refills: 0 | Status: DISCONTINUED | OUTPATIENT
Start: 2024-01-04 | End: 2024-01-18

## 2023-12-21 RX ORDER — SODIUM CHLORIDE 9 MG/ML
1000 INJECTION, SOLUTION INTRAVENOUS
Refills: 0 | Status: DISCONTINUED | OUTPATIENT
Start: 2024-01-04 | End: 2024-01-18

## 2023-12-21 NOTE — H&P PST ADULT - HISTORY OF PRESENT ILLNESS
39 y/o M with PMHx significant for Nicotine Dependance (e-cigarettes), Left Wrist Fracture after falling off ladder (02/2023) s/p ORIF (03/2023), reports he has been experiencing symptoms of left wrist ulnar region pain and limited range of motion since prior procedure. He is now Scheduled for Left Ulnar Shortening Osteotomy with Dr. Avendaño on 01/04/2024 39 y/o M with PMHx significant for Nicotine Dependance (e-cigarettes), Left Wrist Fracture after falling off ladder (02/2023) s/p ORIF (03/2023), reports he has been experiencing symptoms of left wrist ulnar region pain and limited range of motion since prior procedure. He is now Scheduled for Left Ulnar Shortening Osteotomy with Dr. Avendaño on 01/04/2024. 37 y/o M with PMHx significant for Nicotine Dependance (e-cigarettes), Left Wrist Fracture after falling off ladder (02/2023) s/p ORIF (03/2023), reports he has been experiencing symptoms of left wrist ulnar region pain and limited range of motion since prior procedure. He is now Scheduled for Left Ulnar Shortening Osteotomy with Dr. Avendaño on 01/04/2024.

## 2023-12-21 NOTE — H&P PST ADULT - PROBLEM SELECTOR PLAN 1
Scheduled for Left Ulnar Shortening Osteotomy  Pre-op labs obtained. Surgical soap given to patient. PST instructions provided. Patient verbalized understanding of instructions.

## 2023-12-21 NOTE — H&P PST ADULT - ASSESSMENT
DASI score: 7.9 METS  DASI activity: Active, Brisk walking, occasional exercise   Loose teeth or denture: Denies loose teeth or dentures   Mallampati: Class 2

## 2024-01-03 ENCOUNTER — TRANSCRIPTION ENCOUNTER (OUTPATIENT)
Age: 39
End: 2024-01-03

## 2024-01-04 ENCOUNTER — TRANSCRIPTION ENCOUNTER (OUTPATIENT)
Age: 39
End: 2024-01-04

## 2024-01-04 ENCOUNTER — OUTPATIENT (OUTPATIENT)
Dept: OUTPATIENT SERVICES | Facility: HOSPITAL | Age: 39
LOS: 1 days | End: 2024-01-04
Payer: COMMERCIAL

## 2024-01-04 ENCOUNTER — APPOINTMENT (OUTPATIENT)
Dept: ORTHOPEDIC SURGERY | Facility: HOSPITAL | Age: 39
End: 2024-01-04

## 2024-01-04 VITALS
HEART RATE: 56 BPM | OXYGEN SATURATION: 99 % | TEMPERATURE: 98 F | HEIGHT: 73 IN | RESPIRATION RATE: 18 BRPM | SYSTOLIC BLOOD PRESSURE: 126 MMHG | WEIGHT: 138.89 LBS | DIASTOLIC BLOOD PRESSURE: 81 MMHG

## 2024-01-04 VITALS
HEART RATE: 88 BPM | SYSTOLIC BLOOD PRESSURE: 130 MMHG | DIASTOLIC BLOOD PRESSURE: 82 MMHG | RESPIRATION RATE: 14 BRPM | OXYGEN SATURATION: 100 % | TEMPERATURE: 98 F

## 2024-01-04 DIAGNOSIS — M25.832 OTHER SPECIFIED JOINT DISORDERS, LEFT WRIST: ICD-10-CM

## 2024-01-04 DIAGNOSIS — Z98.890 OTHER SPECIFIED POSTPROCEDURAL STATES: Chronic | ICD-10-CM

## 2024-01-04 PROCEDURE — 76000 FLUOROSCOPY <1 HR PHYS/QHP: CPT

## 2024-01-04 PROCEDURE — C1889: CPT

## 2024-01-04 PROCEDURE — C1713: CPT

## 2024-01-04 PROCEDURE — 25390 SHORTEN RADIUS OR ULNA: CPT | Mod: LT

## 2024-01-04 DEVICE — SCREW TRILOCK 2.5X12MM: Type: IMPLANTABLE DEVICE | Status: FUNCTIONAL

## 2024-01-04 DEVICE — SCREW CORT 2.5X14MM: Type: IMPLANTABLE DEVICE | Status: FUNCTIONAL

## 2024-01-04 DEVICE — SCREW CORT 2.5X16MM: Type: IMPLANTABLE DEVICE | Status: FUNCTIONAL

## 2024-01-04 DEVICE — SCREW CORT 2.5X12MM: Type: IMPLANTABLE DEVICE | Status: FUNCTIONAL

## 2024-01-04 DEVICE — SCREW CORT 2.5X18MM: Type: IMPLANTABLE DEVICE | Status: FUNCTIONAL

## 2024-01-04 DEVICE — BOLT TEMPORARY TENSION: Type: IMPLANTABLE DEVICE | Status: FUNCTIONAL

## 2024-01-04 DEVICE — PLATE TRILOCK ULNA SHORTENING: Type: IMPLANTABLE DEVICE | Status: FUNCTIONAL

## 2024-01-04 DEVICE — SCREW TRILOCK 2.5X14MM: Type: IMPLANTABLE DEVICE | Status: FUNCTIONAL

## 2024-01-04 RX ORDER — ACETAMINOPHEN 500 MG
1 TABLET ORAL
Refills: 0 | DISCHARGE

## 2024-01-04 RX ORDER — CEFAZOLIN SODIUM 1 G
2000 VIAL (EA) INJECTION ONCE
Refills: 0 | Status: COMPLETED | OUTPATIENT
Start: 2024-01-04 | End: 2024-01-04

## 2024-01-04 RX ORDER — OXYCODONE 5 MG/1
5 TABLET ORAL
Qty: 12 | Refills: 0 | Status: ACTIVE | COMMUNITY
Start: 2024-01-04 | End: 1900-01-01

## 2024-01-04 NOTE — ASU DISCHARGE PLAN (ADULT/PEDIATRIC) - ASU DISCHARGE DATE/TIME
ED Attending Physician Note      Patient : Miri Callahan Age: 81 year old Sex: female   MRN: [id number] Encounter Date: 11/21/2023      History     Chief Complaint   Patient presents with   • Abnormal Lab Results     HPI: 81 year old female presents with abnormal blood test drawn as outpatient.  Patient was found to have a hemoglobin of 7.6.  Family states that she has history of CVA, \"memory issues\" and currently on Plavix.  Patient has been burdened by frequent nosebleeds including multiple hospitalizations and visits ENT.  Family states that bleeding usually stops but can be quite severe at times.  Patient has noted some blood in stools in the past.  Denies any abdominal pain.  Denies any vomiting.  Denies any chest pain or shortness of breath.  Patient states that she overall feels well now.  No nosebleeds today or currently in the emergency department.                No Known Allergies    Past Medical History:   Diagnosis Date   • Essential (primary) hypertension        No past surgical history on file.    No family history on file.    Social History     Tobacco Use   • Smoking status: Never   • Smokeless tobacco: Never   Vaping Use   • Vaping Use: never used   Substance Use Topics   • Alcohol use: Never   • Drug use: Never             Physical Exam     ED Triage Vitals [11/21/23 1500]   ED Triage Vitals Group      Temp 98.7 °F (37.1 °C)      Heart Rate 75      Resp 19      BP (!) 151/65      SpO2 99 %      EtCO2 mmHg       Height 5' 6\" (1.676 m)      Weight 163 lb 5.8 oz (74.1 kg)      Weight Scale Used Standing scale      BMI (Calculated) 26.37      IBW/kg (Calculated) 59.3       Physical Exam    General: Alert, no acute distress  Skin: Warm, dry  Head: Normocephalic atraumatic  Eye: PERRL, EOMI  ENMT: Oral mucosa moist  Neck: Supple, trachea midline  Cardiovascular: Regular rate, rhythm, S1, S2  Respiratory: Lungs CTA, non-labored respirations, symmetrical expansion  GI: Soft, nontender, nondistended, melena on  exam  MSK: Normal ROM, no swelling, no deformity  Neuro: Alert, oriented, no focal neuro deficits  Psychiatric: Appropriate mood and affect      Lab Results     Results for orders placed or performed during the hospital encounter of 11/21/23   Comprehensive Metabolic Panel   Result Value Ref Range    Fasting Status      Sodium 146 (H) 135 - 145 mmol/L    Potassium 3.6 3.4 - 5.1 mmol/L    Chloride 109 97 - 110 mmol/L    Carbon Dioxide 29 21 - 32 mmol/L    Anion Gap 12 7 - 19 mmol/L    Glucose 84 70 - 99 mg/dL    BUN 23 (H) 6 - 20 mg/dL    Creatinine 0.87 0.51 - 0.95 mg/dL    Glomerular Filtration Rate 66 >=60    BUN/Cr 26 (H) 7 - 25    Calcium 8.6 8.4 - 10.2 mg/dL    Bilirubin, Total 0.1 (L) 0.2 - 1.0 mg/dL    GOT/AST 12 <=37 Units/L    GPT/ALT 20 <64 Units/L    Alkaline Phosphatase 46 45 - 117 Units/L    Albumin 2.6 (L) 3.6 - 5.1 g/dL    Protein, Total 5.8 (L) 6.4 - 8.2 g/dL    Globulin 3.2 2.0 - 4.0 g/dL    A/G Ratio 0.8 (L) 1.0 - 2.4   CBC with Automated Differential (performable only)   Result Value Ref Range    WBC 6.8 4.2 - 11.0 K/mcL    RBC 2.93 (L) 4.00 - 5.20 mil/mcL    HGB 6.8 (LL) 12.0 - 15.5 g/dL    HCT 23.4 (L) 36.0 - 46.5 %    MCV 79.9 78.0 - 100.0 fl    MCH 23.5 (L) 26.0 - 34.0 pg    MCHC 29.5 (L) 32.0 - 36.5 g/dL    RDW-CV 29.4 (H) 11.0 - 15.0 %    RDW-SD 78.6 (H) 39.0 - 50.0 fL     140 - 450 K/mcL    NRBC 0 <=0 /100 WBC    Neutrophil, Percent 61 %    Lymphocytes, Percent 27 %    Mono, Percent 10 %    Eosinophils, Percent 2 %    Basophils, Percent 0 %    Immature Granulocytes 0 %    Absolute Neutrophils 4.1 1.8 - 7.7 K/mcL    Absolute Lymphocytes 1.8 1.0 - 4.0 K/mcL    Absolute Monocytes 0.7 0.3 - 0.9 K/mcL    Absolute Eosinophils  0.1 0.0 - 0.5 K/mcL    Absolute Basophils 0.0 0.0 - 0.3 K/mcL    Absolute Immature Granulocytes 0.0 0.0 - 0.2 K/mcL   Prothrombin Time (INR/PT)   Result Value Ref Range    Protime- PT 11.6 9.7 - 11.8 sec    INR 1.1     Partial Thromboplastin Time (PTT)   Result Value  Ref Range    PTT 23 22 - 32 sec   Manual Differential   Result Value Ref Range    Hypochromia Few     Microcytosis Few     Ovalocytes Few     Platelet Morphology Normal Normal    Polychromasia Few    TYPE/SCREEN   Result Value Ref Range    ABO/RH(D) A Rh Negative     ANTIBODY SCREEN Negative     TYPE AND SCREEN EXPIRATION DATE 11/24/2023 23:59    Prepare Red Blood Cells: 2 Units   Result Value Ref Range    UNIT BLOOD TYPE A Neg     ISBT BLOOD TYPE 0600     BLOOD EXPIRATION DATE 20231204235900     UNIT NUMBER [id number]     DISPENSE STATUS Issued     PRODUCT ID Red Blood Cells     PRODUCT CODE [id number]     PRODUCT DESCRIPTION RBC AS-1 LR     UNIT BLOOD TYPE A Neg     ISBT BLOOD TYPE 0600     BLOOD EXPIRATION DATE 20231207235900     UNIT NUMBER [id number]     DISPENSE STATUS Issued     PRODUCT ID Red Blood Cells     PRODUCT CODE [id number]     PRODUCT DESCRIPTION RBC AS-1 LR     CROSSMATCH RESULT Compatible     CROSSMATCH RESULT Compatible     ISSUE DATE/TIME [id number]     ISSUE DATE/TIME [id number]          Radiology Results     Imaging Results    None                  Medical Decision Making   Differential diagnoses considered in this patient include:   Chronic nosebleed causing anemia, upper GI bleeding    Limitations to history/exam/care: dementia  Co-morbidities impacting care: multiple; as documented above/HPI  Social factors impacting care: none known   External records reviewed: none available  Tests considered but not performed: N/A  Consults:       Rhythm Strip: Cardiac Monitor independently reviewed by myself shows sinus rhythm, normal rate, with no ectopy.   Pulse Ox is >95% which is normal for this patient    Vital signs reviewed patient is hemodynamically stable afebrile and in no acute distress.  Patient's hemoglobin today was found to be 6.8.  Mildly elevated BUN.  No significant electrolyte abnormalities or AZUL.  Rectal exam does yield melena though this could be secondary to swallowed  blood from epistaxis versus upper GI bleeding.  No current evidence of epistaxis.  Patient started on Protonix as precaution.  Will transfuse 2 units of packed red blood cells.  Would like to admit for ENT and GI consultation.    Critical Care Time  Critical care was provided to prevent clinically significant and life-threatening deterioration of the patient’s condition. Pt required greater than [35 minutes] of critical care time including initial evaluation, time spent at the bedside, multiple re-evaluations, discussion with the patient, family and consultants.  This time also includes time reviewing the medical records, laboratory and x-ray results, and time spent documenting.  This time excluded any and all procedures.        Pt was discussed with admitting physician/team. Admitted for further care.       Clinical Impression     ED Diagnosis   1. Chronic blood loss anemia        2. Melena              Disposition        Admit 11/21/2023  8:19 PM  Telemetry Bed?: Yes  Admitting Physician: TULIO LAMBERT [[id number]]  Is this a telephone or verbal order?: This is a telephone order from the admitting physician                       Andres Cornell MD  11/22/23 0158     04-Jan-2024 16:50

## 2024-01-04 NOTE — ASU DISCHARGE PLAN (ADULT/PEDIATRIC) - CARE PROVIDER_API CALL
Jamari Avendaño [place]  Orthopaedic Surgery  [address] [place] [address], [facility] [address]  [place] [address]  Phone: [phone]  Fax: [phone]  Follow Up Time: 1 week   Jamari Avendaño [place]  Orthopaedic Surgery  [address] [place] [address], [facility] [address]  [place] [address]  Phone: [phone]  Fax: [phone]  Follow Up Time: 1 week

## 2024-01-04 NOTE — ASU DISCHARGE PLAN (ADULT/PEDIATRIC) - PROVIDER TOKENS
PROVIDER:[TOKEN:[[id number]],FOLLOWUP:[1 week]] PROVIDER:[TOKEN:[[id number]],FOLLOWUP:[1 week]]

## 2024-01-04 NOTE — ASU DISCHARGE PLAN (ADULT/PEDIATRIC) - ASU DC SPECIAL INSTRUCTIONSFT
Please see handout from Dr. Avendaño for specific instructions including follow up. If you are supposed to take any specific medications postoperatively, they have already been sent to your pharmacy.

## 2024-01-04 NOTE — PRE-ANESTHESIA EVALUATION ADULT - NSATTENDATTESTRD_GEN_ALL_CORE
The patient has been re-examined and I agree with the above assessment or I updated with my findings. Nicolás, Jeffry THOMAS

## 2024-01-04 NOTE — ASU PATIENT PROFILE, ADULT - FALL HARM RISK - UNIVERSAL INTERVENTIONS
Bed in lowest position, wheels locked, appropriate side rails in place/Call bell, personal items and telephone in reach/Instruct patient to call for assistance before getting out of bed or chair/Non-slip footwear when patient is out of bed/[place] to call system/Physically safe environment - no spills, clutter or unnecessary equipment/Purposeful Proactive Rounding/Room/bathroom lighting operational, light cord in reach Bed in lowest position, wheels locked, appropriate side rails in place/Call bell, personal items and telephone in reach/Instruct patient to call for assistance before getting out of bed or chair/Non-slip footwear when patient is out of bed/[place] to call system/Physically safe environment - no spills, clutter or unnecessary equipment/Purposeful Proactive Rounding/Room/bathroom lighting operational, light cord in reach

## 2024-01-04 NOTE — ASU PATIENT PROFILE, ADULT - PATIENT'S SEXUAL ORIENTATION
Diaper Rash in Children: Care Instructions  Your Care Instructions  Any rash on the area covered by the diaper is called diaper rash. Most diaper rashes are caused by wearing a wet diaper for too long. This allows urine and stool to irritate the skin. Infection from bacteria or yeast can also cause diaper rash. Most diaper rashes last about 24 hours and can be treated at home. Follow-up care is a key part of your child's treatment and safety. Be sure to make and go to all appointments, and call your doctor if your child is having problems. It's also a good idea to know your child's test results and keep a list of the medicines your child takes. How can you care for your child at home? · Change diapers as soon as they are wet or dirty. Before you put a new diaper on your baby, gently wash the diaper area with warm water. Rinse and pat dry. Wash your hands before and after each diaper change. · It can be hard to tell when a diaper is wet if you use disposable diapers. If you cannot tell, put a piece of tissue in the diaper. It will be wet when your baby urinates. · Air the diaper area for 5 to 10 minutes before you put on a new diaper. · Do not use baby wipes that contain alcohol or propylene glycol while your baby has a rash. These may burn the skin. · Wash cloth diapers with mild detergent. Do not use bleach. · Do not use plastic pants for a while if your child has a diaper rash. They can trap moisture against the skin. · Do not use baby powder while your baby has a rash. The powder can build up in the skin folds and hold moisture. This lets bacteria grow. · Protect your baby's skin with A+D Ointment, Desitin, or another diaper cream.  · If your child develops a diaper rash, use a diaper cream such as A+D Ointment, Desitin, Diaparene, or zinc oxide with each diaper change. · If rashes continue, try a different brand of disposable diaper. Some babies react to one brand more than another brand.   When should you call for help? Call your doctor now or seek immediate medical care if:    · Your baby has pimples, blisters, open sores, or scabs in the diaper area.     · Your baby has signs of an infection from diaper rash, including:  ¨ Increased pain, swelling, warmth, or redness. ¨ Red streaks leading from the rash. ¨ Pus draining from the rash. ¨ A fever.    Watch closely for changes in your child's health, and be sure to contact your doctor if:    · Your baby's rash is mainly in the skin folds. This could be a yeast infection.     · Your baby's diaper rash looks like a rash that is on other parts of his or her body.     · Your baby's rash is not better after 2 or 3 days of treatment. Where can you learn more? Go to http://mckenzie-kirit.info/. Enter I429 in the search box to learn more about \"Diaper Rash in Children: Care Instructions. \"  Current as of: November 20, 2017  Content Version: 11.7  © 9725-6625 [employer]. Care instructions adapted under license by [employer] (which disclaims liability or warranty for this information). If you have questions about a medical condition or this instruction, always ask your healthcare professional. [address] any warranty or liability for your use of this information. Heterosexual

## 2024-01-04 NOTE — PRE-ANESTHESIA EVALUATION ADULT - HEART RATE (BEATS/MIN)
56
Pt received supine in bed +primafit, PIV, cardiac monitor. Alert and oriented x1-2 following simple directions with ~25%-50% accuracy. Pt with fluctuating arousal through session non-vital dependent. Pt left supine in bed with lines intact, call bell in reach, bed alarm on, and pt repositioned for pressure relief.

## 2024-01-04 NOTE — ASU DISCHARGE PLAN (ADULT/PEDIATRIC) - NS MD DC FALL RISK RISK
For information on Fall & Injury Prevention, visit: [email or web address] OR  [email or web address] OR  https://www.cdc.gov/steadi/patient.html For information on Fall & Injury Prevention, visit: [email or web address] OR  [email or web address]-[email or web address]-avoid-injury OR  https://www.cdc.gov/steadi/patient.html

## 2024-01-14 ENCOUNTER — NON-APPOINTMENT (OUTPATIENT)
Age: 39
End: 2024-01-14

## 2024-01-16 ENCOUNTER — APPOINTMENT (OUTPATIENT)
Dept: ORTHOPEDIC SURGERY | Facility: CLINIC | Age: 39
End: 2024-01-16
Payer: MEDICAID

## 2024-01-16 PROBLEM — F17.200 NICOTINE DEPENDENCE, UNSPECIFIED, UNCOMPLICATED: Chronic | Status: ACTIVE | Noted: 2023-12-21

## 2024-01-16 PROBLEM — S52.502A UNSPECIFIED FRACTURE OF THE LOWER END OF LEFT RADIUS, INITIAL ENCOUNTER FOR CLOSED FRACTURE: Chronic | Status: ACTIVE | Noted: 2023-12-21

## 2024-01-16 PROCEDURE — 99024 POSTOP FOLLOW-UP VISIT: CPT

## 2024-01-16 PROCEDURE — 73110 X-RAY EXAM OF WRIST: CPT | Mod: LT

## 2024-02-06 ENCOUNTER — APPOINTMENT (OUTPATIENT)
Dept: ORTHOPEDIC SURGERY | Facility: CLINIC | Age: 39
End: 2024-02-06

## 2024-02-28 ENCOUNTER — APPOINTMENT (OUTPATIENT)
Dept: ORTHOPEDIC SURGERY | Facility: CLINIC | Age: 39
End: 2024-02-28
Payer: COMMERCIAL

## 2024-02-28 DIAGNOSIS — S52.572A OTHER INTRAARTICULAR FRACTURE OF LOWER END OF LEFT RADIUS, INITIAL ENCOUNTER FOR CLOSED FRACTURE: ICD-10-CM

## 2024-02-28 DIAGNOSIS — M25.832 OTHER SPECIFIED JOINT DISORDERS, LEFT WRIST: ICD-10-CM

## 2024-02-28 PROCEDURE — 73090 X-RAY EXAM OF FOREARM: CPT | Mod: LT

## 2024-02-28 PROCEDURE — 73110 X-RAY EXAM OF WRIST: CPT | Mod: LT

## 2024-02-28 PROCEDURE — 99024 POSTOP FOLLOW-UP VISIT: CPT

## 2024-04-10 ENCOUNTER — APPOINTMENT (OUTPATIENT)
Dept: ORTHOPEDIC SURGERY | Facility: CLINIC | Age: 39
End: 2024-04-10

## 2024-08-21 NOTE — PRE-ANESTHESIA EVALUATION ADULT - NSANTHTOBACCOSD_GEN_ALL_CORE
Vape/Yes [Avoidant] : avoidant [Anxious] : anxious [Flat] : flat [de-identified] : occasional shaking i [de-identified] : mute [Average] : average [Cooperative] : cooperative [Depressed] : depressed [Full] : full [Clear] : clear [Linear/Goal Directed] : linear/goal directed [WNL] : within normal limits [de-identified] : limited [de-identified] : fair

## 2024-09-16 ENCOUNTER — APPOINTMENT (OUTPATIENT)
Dept: ORTHOPEDIC SURGERY | Facility: CLINIC | Age: 39
End: 2024-09-16

## 2024-10-23 DIAGNOSIS — M25.832 OTHER SPECIFIED JOINT DISORDERS, LEFT WRIST: ICD-10-CM

## 2024-10-23 DIAGNOSIS — S52.572A OTHER INTRAARTICULAR FRACTURE OF LOWER END OF LEFT RADIUS, INITIAL ENCOUNTER FOR CLOSED FRACTURE: ICD-10-CM

## 2025-04-21 NOTE — ASU PATIENT PROFILE, ADULT - FALL HARM RISK - UNIVERSAL INTERVENTIONS
Occupational Therapy Visit    Visit Type: Daily Treatment Note  Visit: 3  Referring Provider: BETTY Segura MD  Next referring provider visit: 4/29/2025  Medical Diagnosis (from order): S46.011A - Strain of right rotator cuff capsule, initial encounter     SUBJECTIVE                                                                                                               \"I'm sorry I'm late, can you please just work on this one muscle?\"  Functional Change: Patient reporting overall improvement in light AROM of right shoulder, but significant limitations in shoulder abduction and internal rotation  ~30 degrees secondary to pain increases and weakness    Pain / Symptoms  - Pain rating (out of 10): Current: 8     Worker's Compensation Information  Occupation Information  - Occupation: Sort Associate  - Employer:  [employer] [address]  [place] [address]     - Restrictions: off work  - Current Work Status: off work due to current condition  - Full Duty Work Demands: medium (20 - 50 lbs)  - lifting from floor, lifting overhead, work overhead and standing >50% of the day     Notes  - Attendance concerns: Arrived 35 minutes late on 4/21/25      OBJECTIVE                                                                                                                    Observation   Patient arrives holding right shoulder in guarded posture    Range of Motion (ROM)   (degrees unless noted; active unless noted; norms in ( ); negative=lacking to 0, positive=beyond 0)  Shoulder:    - Flexion (180):        • Right:   Passive: 150    - Abduction (180):        • Right: 30 pain   Passive: 140    - Scaption:        • Right: 130    - Internal Rotation:         • Right: pain        - at 45°:            • Right:   Passive: 40    - External Rotation:       - at 0°:            • Right:   Passive: 80       - at 45°:            • Right:   Passive: 70                            Treatment     Manual Therapy   Completed  passive range of motion to right shoulder in all planes - most pain and muscle guarding remains in abduction and internal rotation, especially eccentric return, but passive range of motion ranges improved see above    Therapeutic Activity  Reinforced prompt arrival for appointments to maximize therapeutic benefit and expected outcomes  Reviewed HEP and VUOM stretching into abduction for progression into range  Due to mechanism of injury, limited progress with ROM and high pain levels, patient to hold on appointment scheduled for later this week and see orthopedics prior to next appointment for further assessment, patient agreeable to plan    Skilled input: verbal instruction/cues, tactile instruction/cues and posture correction    Writer verbally educated and received verbal consent for hand placement, positioning of patient, and techniques to be performed today from patient for hand placement and palpation for techniques as described above and how they are pertinent to the patient's plan of care.  Home Exercise Program  Access Code: [email or web address]  URL: [email or web address]  Date: 04/16/2025  Prepared by: Jasmin Moore    Exercises  - Seated Scapular Retraction  - 3 x daily - 7 x weekly - 1-2 sets - 10 reps - 3 hold  - Flexion-Extension Shoulder Pendulum with Table Support  - 3 x daily - 7 x weekly - 1 sets - 1 reps - 30 hold  - Seated Shoulder Flexion Slide at Table Top with Forearm in Neutral  - 3 x daily - 7 x weekly - 1 sets - 10 reps - 10 hold  - Seated Shoulder Scaption Slide at Table Top with Forearm in Neutral  - 3 x daily - 7 x weekly - 1 sets - 10 reps - 10 hold      ASSESSMENT                                                                                                            *The patient arrived 35 minutes late to scheduled appointment, still requesting to be seen. Patient reporting she was at lunch*  The patient presents with mild improvements in shoulder AROM and passive range  of motion ranges, except for shoulder abduction and internal rotation which are limited in ranges secondary to pain increases up to a reported 8/10. Patient point tender through distal supraspinatus and axilla with palpation. Given patient's mechanism of injury, significant limitation in active motion and good relief with reviewed AAROM exercises for shoulder abduction, patient to hold off on further OT until further assessment as scheduled by orthopedics next week - patient agreeable to plan  Education:   - Results of above outlined education: Verbalizes understanding and Demonstrates understanding    PLAN                                                                                                                           Suggestions for next session as indicated: Progress per plan of care:  TRX AAROM, isometrics, rows       Therapy procedure time and total treatment time can be found documented on the Time Entry flowsheet   Bed in lowest position, wheels locked, appropriate side rails in place/Call bell, personal items and telephone in reach/Instruct patient to call for assistance before getting out of bed or chair/Non-slip footwear when patient is out of bed/[place] to call system/Physically safe environment - no spills, clutter or unnecessary equipment/Purposeful Proactive Rounding/Room/bathroom lighting operational, light cord in reach

## 2025-08-19 ENCOUNTER — APPOINTMENT (OUTPATIENT)
Dept: ORTHOPEDIC SURGERY | Facility: CLINIC | Age: 40
End: 2025-08-19
Payer: OTHER MISCELLANEOUS

## 2025-08-19 ENCOUNTER — NON-APPOINTMENT (OUTPATIENT)
Age: 40
End: 2025-08-19

## 2025-08-19 DIAGNOSIS — M25.832 OTHER SPECIFIED JOINT DISORDERS, LEFT WRIST: ICD-10-CM

## 2025-08-19 DIAGNOSIS — S52.572A OTHER INTRAARTICULAR FRACTURE OF LOWER END OF LEFT RADIUS, INITIAL ENCOUNTER FOR CLOSED FRACTURE: ICD-10-CM

## 2025-08-19 PROCEDURE — 99214 OFFICE O/P EST MOD 30 MIN: CPT | Mod: 25

## (undated) DEVICE — GLV 7 PROTEXIS (WHITE)

## (undated) DEVICE — DRSG WEBRIL 4"

## (undated) DEVICE — SPECIMEN CONTAINER 100ML

## (undated) DEVICE — DRSG CAST PLASTER 3" (BLUE)

## (undated) DEVICE — DRAPE C ARM MINI PACK FOR 6800

## (undated) DEVICE — SUT POLYSORB 3-0 18" P-12 UNDYED

## (undated) DEVICE — WARMING BLANKET LOWER ADULT

## (undated) DEVICE — DRSG COBAN 4"

## (undated) DEVICE — SOL IRR POUR H2O 250ML

## (undated) DEVICE — DRSG KLING 3"

## (undated) DEVICE — TOURNIQUET ESMARK 4"

## (undated) DEVICE — PACK HAND

## (undated) DEVICE — PREP CHLORAPREP HI-LITE ORANGE 26ML

## (undated) DEVICE — SAW BLADE STRYKER MICRO

## (undated) DEVICE — DRSG CAST PLASTER XFAST 3"

## (undated) DEVICE — DRSG ACE BANDAGE 4" NS

## (undated) DEVICE — SUT MONOCRYL 4-0 18" P-3

## (undated) DEVICE — VENODYNE/SCD SLEEVE CALF LARGE

## (undated) DEVICE — BLADE SCREWDRIVER 2.5/2.8MM HD7

## (undated) DEVICE — POSITIONER FOAM EGG CRATE ULNAR 2PCS (PINK)

## (undated) DEVICE — TOURNIQUET CUFF 24" DUAL PORT DUAL BLADDER W PLC (BLACK)

## (undated) DEVICE — BLADE SURGICAL #15 CARBON

## (undated) DEVICE — TOURNIQUET CUFF 18" DUAL PORT DUAL BLADDER W PLC (BLACK)

## (undated) DEVICE — DRILL BIT MEDARTIS TWIST 2X40X91MM

## (undated) DEVICE — SOL IRR POUR NS 0.9% 500ML